# Patient Record
Sex: FEMALE | Race: BLACK OR AFRICAN AMERICAN | NOT HISPANIC OR LATINO | Employment: OTHER | URBAN - METROPOLITAN AREA
[De-identification: names, ages, dates, MRNs, and addresses within clinical notes are randomized per-mention and may not be internally consistent; named-entity substitution may affect disease eponyms.]

---

## 2019-02-18 ENCOUNTER — CONSULTATION (OUTPATIENT)
Dept: URBAN - METROPOLITAN AREA CLINIC 11 | Facility: CLINIC | Age: 69
End: 2019-02-18

## 2019-02-18 NOTE — PROCEDURE NOTE: CLINICAL
PROCEDURE NOTE: Avastin () #1 OD. Diagnosis: Diabetes, Type II with Ocular Complications. Anesthesia: Topical/Subconjunctival. Prep: Betadine Drops and Betadine Scrub. Intravitreal injection of Avastin 1.25mg/0.05 ml was given. Injection site: 3-4 mm from the limbus. Patient tolerated procedure well. CF vision checked. There were no complications. Post-op instructions given. Lot #: [id number]. Expiration Date: Wed Apr 10 2019 00:00:[address] Daylight Time). Inventory Id: [email or web address] [id number]

## 2019-02-18 NOTE — PATIENT DISCUSSION
Based on today’s exam, diagnostic studies, and/or review of records, the determination was made for treatment today in her right eye.

## 2019-02-18 NOTE — PATIENT DISCUSSION
Patient will need laser for diabetic retinopathy in the near future in both eyes based on clinical findings.

## 2019-02-18 NOTE — PATIENT DISCUSSION
Recommended an Avastin injection today in her right eye.  Recommended that she return in one month for an Avastin injection in her left eye.  Once the Avastin injections are complete, I have recommended PRP in both eyes in the future.

## 2019-02-21 ASSESSMENT — VISUAL ACUITY
OS_PH: 20/40-1
OS_CC: 20/50+1
OD_CC: 20/40

## 2019-06-27 ENCOUNTER — FOLLOW UP (OUTPATIENT)
Dept: URBAN - METROPOLITAN AREA CLINIC 11 | Facility: CLINIC | Age: 69
End: 2019-06-27

## 2019-06-27 ASSESSMENT — TONOMETRY
OS_IOP_MMHG: 17
OD_IOP_MMHG: 14

## 2019-06-27 ASSESSMENT — VISUAL ACUITY
OS_CC: 20/70-2
OD_PH: 20/25-2
OS_PH: 20/40-1
OD_CC: 20/40-1

## 2019-06-27 NOTE — PATIENT DISCUSSION
She has responded well to treatment and I have deferred any treatment while she is here today. I will see her back in 1 month for re-evaluation. She will call if she has any problems before then.

## 2019-08-20 ENCOUNTER — FOLLOW UP (OUTPATIENT)
Dept: URBAN - METROPOLITAN AREA CLINIC 11 | Facility: CLINIC | Age: 69
End: 2019-08-20

## 2019-08-20 ASSESSMENT — VISUAL ACUITY
OD_PH: 20/25-2
OD_CC: 20/30-2
OS_CC: 20/70

## 2019-08-20 ASSESSMENT — TONOMETRY
OS_IOP_MMHG: 17
OD_IOP_MMHG: 16

## 2019-08-20 NOTE — PROCEDURE NOTE: CLINICAL
PROCEDURE NOTE: Avastin () #2 OS. Diagnosis: Diabetes, Type II with Ocular Complications. Anesthesia: Topical/Subconjunctival. Prep: Betadine Drops and Betadine Scrub. Intravitreal injection of Avastin 1.25mg/0.05 ml was given. Injection site: 3-4 mm from the limbus. Patient tolerated procedure well. CF vision checked. There were no complications. Post-op instructions given. Lot #: [id number]. Expiration Date: Mon Sep 23 2019 00:00:[address] Daylight Time). Inventory Id: null. Tierney Jain

## 2019-08-20 NOTE — PATIENT DISCUSSION
Avastin #2 recommended today for the left eye. I will bring her back in a few weeks for PRP in the left eye.

## 2020-02-04 ENCOUNTER — FOLLOW UP (OUTPATIENT)
Dept: URBAN - METROPOLITAN AREA CLINIC 11 | Facility: CLINIC | Age: 70
End: 2020-02-04

## 2020-02-04 ASSESSMENT — TONOMETRY
OS_IOP_MMHG: 13
OD_IOP_MMHG: 15

## 2020-02-04 ASSESSMENT — VISUAL ACUITY
OS_CC: 20/100-1
OD_CC: 20/30+1

## 2020-02-04 NOTE — PATIENT DISCUSSION
She has responded well to treatment and she is clear to have cataract surgery. I will see her again in 2 months for re-evaluation.

## 2020-05-07 ENCOUNTER — FOLLOW UP (OUTPATIENT)
Dept: URBAN - METROPOLITAN AREA CLINIC 11 | Facility: CLINIC | Age: 70
End: 2020-05-07

## 2020-05-07 ASSESSMENT — VISUAL ACUITY
OD_CC: 20/30-1
OS_CC: 20/50-2

## 2020-05-07 ASSESSMENT — TONOMETRY
OS_IOP_MMHG: 11
OD_IOP_MMHG: 12

## 2020-05-07 NOTE — PATIENT DISCUSSION
I have explained to Nolan Patton. Perla Howard that there are some blood cells present in the vitreous of the left eye. I have recommended observation at this stage. I feel this will clear without surgery. I will see her again in 2 months, or sooner, if she has any problems or concerns.

## 2020-08-11 ENCOUNTER — FOLLOW UP (OUTPATIENT)
Dept: URBAN - METROPOLITAN AREA CLINIC 11 | Facility: CLINIC | Age: 70
End: 2020-08-11

## 2020-08-11 ASSESSMENT — VISUAL ACUITY
OS_CC: CF 1FT
OD_CC: 20/40

## 2020-08-11 ASSESSMENT — TONOMETRY
OS_IOP_MMHG: 18
OD_IOP_MMHG: 16

## 2020-08-11 NOTE — PATIENT DISCUSSION
Recommended Avastin injection in the left eye.  Explained condition and treatment with patient. Injection given and tolerated well by patient.

## 2020-08-11 NOTE — PROCEDURE NOTE: CLINICAL
PROCEDURE NOTE: Avastin () #3 OS. Diagnosis: Diabetes, Type II with Ocular Complications. Anesthesia: Topical/Subconjunctival. Prep: Betadine Drops and Betadine Scrub. Intravitreal injection of Avastin 1.25mg/0.05 ml was given. Injection site: 3-4 mm from the limbus. Patient tolerated procedure well. CF vision checked. There were no complications. Post-op instructions given. Lot #: [id number]. Expiration Date: 9/15/2020. Inventory Id: [email or web address] Hassan

## 2020-09-15 ENCOUNTER — FOLLOW UP (OUTPATIENT)
Dept: URBAN - METROPOLITAN AREA CLINIC 11 | Facility: CLINIC | Age: 70
End: 2020-09-15

## 2020-09-15 ASSESSMENT — TONOMETRY: OS_IOP_MMHG: 17

## 2020-09-15 ASSESSMENT — VISUAL ACUITY
OS_CC: CF 4FT
OD_CC: 20/30-1

## 2020-09-15 NOTE — PATIENT DISCUSSION
Explained to patient that there is not much improvement in the vitreous hemorrhage since her last visit.  I explained to her that I would defer any surgical intervention at this point.  Observation is recommended.

## 2021-02-23 ENCOUNTER — FOLLOW UP (OUTPATIENT)
Dept: URBAN - METROPOLITAN AREA CLINIC 11 | Facility: CLINIC | Age: 71
End: 2021-02-23

## 2021-02-23 NOTE — PATIENT DISCUSSION
35 minutes spent in discussion with Ms. Nydia Barkley. I have explained that the hemorrhage in her left eye has not cleared and my recommendation is to schedule her for vitrectomy surgery. I will plan to do more laser at the time of her surgery and will use silicone oil to keep her retina in place. All questions and concerns addressed at length and she will call when she is ready to schedule.

## 2021-02-24 ASSESSMENT — TONOMETRY
OD_IOP_MMHG: 14
OS_IOP_MMHG: 19

## 2021-02-24 ASSESSMENT — VISUAL ACUITY: OD_CC: 20/40

## 2021-07-02 ENCOUNTER — POST-OP CHECK (OUTPATIENT)
Dept: URBAN - METROPOLITAN AREA CLINIC 11 | Facility: CLINIC | Age: 71
End: 2021-07-02

## 2021-07-02 DIAGNOSIS — E11.3593: ICD-10-CM

## 2021-07-02 DIAGNOSIS — H33.42: ICD-10-CM

## 2021-07-02 DIAGNOSIS — H43.12: ICD-10-CM

## 2021-07-02 PROCEDURE — 99024 POSTOP FOLLOW-UP VISIT: CPT

## 2021-07-02 ASSESSMENT — TONOMETRY
OS_IOP_MMHG: 09
OD_IOP_MMHG: 15

## 2021-07-02 ASSESSMENT — VISUAL ACUITY: OD_CC: 20/40

## 2021-07-02 NOTE — PATIENT DISCUSSION
30 minutes spent in face to face dialogue with patient. Her right eye is doing well and her diabetic retinopathy is stable. Poor visual prognosis for her left eye. I will see her in 3 months for re-evaluation, She is to call if she has any problems or concerns.

## 2021-09-30 ENCOUNTER — FOLLOW UP (OUTPATIENT)
Dept: URBAN - METROPOLITAN AREA CLINIC 11 | Facility: CLINIC | Age: 71
End: 2021-09-30

## 2021-09-30 DIAGNOSIS — H33.42: ICD-10-CM

## 2021-09-30 DIAGNOSIS — E11.3593: ICD-10-CM

## 2021-09-30 PROCEDURE — 99214 OFFICE O/P EST MOD 30 MIN: CPT

## 2021-09-30 ASSESSMENT — TONOMETRY
OS_IOP_MMHG: 07
OD_IOP_MMHG: 13

## 2021-09-30 ASSESSMENT — VISUAL ACUITY: OD_CC: 20/30-2

## 2021-09-30 NOTE — PATIENT DISCUSSION
30 minutes spent in face to face dialogue with patient. Her right eye is doing well and her diabetic retinopathy is stable. Poor visual prognosis for her left eye and I have recommended that we leave the silicone oil.  I will see her in 4 months for re-evaluation, She is to call if she has any problems or concerns. I would like for her to see you for general ophthalmic care. She will call your office to make an appointment.

## 2022-01-27 ENCOUNTER — COMPREHENSIVE EXAM (OUTPATIENT)
Dept: URBAN - METROPOLITAN AREA CLINIC 11 | Facility: CLINIC | Age: 72
End: 2022-01-27

## 2022-01-27 DIAGNOSIS — H33.42: ICD-10-CM

## 2022-01-27 DIAGNOSIS — H43.12: ICD-10-CM

## 2022-01-27 DIAGNOSIS — E11.3593: ICD-10-CM

## 2022-01-27 PROCEDURE — 99214 OFFICE O/P EST MOD 30 MIN: CPT

## 2022-01-27 ASSESSMENT — TONOMETRY
OD_IOP_MMHG: 12
OS_IOP_MMHG: 10

## 2022-01-27 ASSESSMENT — VISUAL ACUITY: OD_CC: 20/30-2

## 2022-01-27 NOTE — PATIENT DISCUSSION
35 minutes spent in face to face dialogue with patient. Her right eye is doing well and her diabetic retinopathy is stable. Poor visual prognosis for her left eye and I have recommended that we leave the silicone oil.  I discussed with her that her cataract is progressing and I advised her to check in with you for evaluation and possible surgery scheduling. I will planto see her in 4 months for re-evaluation.

## 2022-05-05 ENCOUNTER — COMPREHENSIVE EXAM (OUTPATIENT)
Dept: URBAN - METROPOLITAN AREA CLINIC 11 | Facility: CLINIC | Age: 72
End: 2022-05-05

## 2022-05-05 DIAGNOSIS — E11.3593: ICD-10-CM

## 2022-05-05 DIAGNOSIS — H33.42: ICD-10-CM

## 2022-05-05 DIAGNOSIS — H43.12: ICD-10-CM

## 2022-05-05 PROCEDURE — 99214 OFFICE O/P EST MOD 30 MIN: CPT

## 2022-05-05 PROCEDURE — 92134 CPTRZ OPH DX IMG PST SGM RTA: CPT

## 2022-05-05 ASSESSMENT — VISUAL ACUITY
OD_PH: 20/50+1
OD_SC: 20/100

## 2022-05-05 ASSESSMENT — TONOMETRY
OD_IOP_MMHG: 20
OS_IOP_MMHG: 11

## 2022-05-05 NOTE — PATIENT DISCUSSION
45 minutes spent in face to face dialogue with patient. Her right eye is doing well and her diabetic retinopathy is stable. Poor visual prognosis for her left eye and I have recommended that we leave the silicone oil.  I discussed with her that her cataract is progressing and I advised her to check in with you for evaluation and possible surgery scheduling in the Summer. Patient will contact your office for an appointment.  She is cleared for cataract surgery. I will see her in 6 months for re-evaluation.

## 2022-06-18 RX ORDER — INSULIN LISPRO 100 [IU]/ML
INJECTION, SOLUTION INTRAVENOUS; SUBCUTANEOUS
COMMUNITY
Start: 2021-08-10 | End: 2022-08-26

## 2022-06-18 RX ORDER — AMLODIPINE BESYLATE 5 MG/1
TABLET ORAL
COMMUNITY

## 2022-06-18 RX ORDER — CIPROFLOXACIN 500 MG/1
TABLET, FILM COATED ORAL
COMMUNITY

## 2022-06-18 RX ORDER — INSULIN LISPRO 100 [IU]/ML
INJECTION, SOLUTION INTRAVENOUS; SUBCUTANEOUS
COMMUNITY

## 2022-06-18 RX ORDER — DOXYCYCLINE HYCLATE 100 MG
TABLET ORAL
COMMUNITY

## 2022-06-18 RX ORDER — OMEPRAZOLE 40 MG/1
1 CAPSULE, DELAYED RELEASE ORAL
COMMUNITY

## 2022-07-17 PROBLEM — G93.32 CHRONIC FATIGUE SYNDROME: Status: ACTIVE | Noted: 2022-07-17

## 2022-07-17 PROBLEM — N61.1 ABSCESS OF RIGHT BREAST: Status: ACTIVE | Noted: 2022-07-17

## 2022-07-17 PROBLEM — E11.9 DIABETES MELLITUS WITHOUT COMPLICATION (HCC): Status: ACTIVE | Noted: 2022-07-17

## 2022-07-17 PROBLEM — I10 ESSENTIAL HYPERTENSION: Status: ACTIVE | Noted: 2022-07-17

## 2022-07-17 PROBLEM — D24.1 INTRADUCTAL PAPILLOMA OF RIGHT BREAST: Status: ACTIVE | Noted: 2022-07-17

## 2022-07-17 PROBLEM — E78.5 HYPERLIPIDEMIA: Status: ACTIVE | Noted: 2022-07-17

## 2022-08-05 PROBLEM — E11.42 DIABETIC POLYNEUROPATHY (HCC): Status: ACTIVE | Noted: 2019-05-17

## 2022-08-05 PROBLEM — M20.40 ACQUIRED HAMMER TOE: Status: ACTIVE | Noted: 2019-05-17

## 2022-08-05 PROBLEM — N60.11 FIBROCYSTIC CHANGES OF RIGHT BREAST: Status: ACTIVE | Noted: 2022-08-05

## 2022-08-05 PROBLEM — I10 HYPERTENSION: Status: ACTIVE | Noted: 2022-08-05

## 2022-08-05 PROBLEM — E11.9 DIABETES MELLITUS (HCC): Status: ACTIVE | Noted: 2022-08-05

## 2022-08-05 PROBLEM — J86.9 ABSCESS OF CHEST (HCC): Status: ACTIVE | Noted: 2022-08-05

## 2022-08-05 PROBLEM — E11.69 TYPE 2 DIABETES MELLITUS WITH OTHER SPECIFIED COMPLICATION (HCC): Status: ACTIVE | Noted: 2022-08-05
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
## 2022-08-05 PROBLEM — M79.606 PAIN IN LOWER LIMB: Status: ACTIVE | Noted: 2019-05-17

## 2022-11-07 ENCOUNTER — COMPREHENSIVE EXAM (OUTPATIENT)
Dept: URBAN - METROPOLITAN AREA CLINIC 11 | Facility: CLINIC | Age: 72
End: 2022-11-07

## 2022-11-07 DIAGNOSIS — E11.3593: ICD-10-CM

## 2022-11-07 DIAGNOSIS — H25.11: ICD-10-CM

## 2022-11-07 DIAGNOSIS — Z79.4: ICD-10-CM

## 2022-11-07 PROCEDURE — 99214 OFFICE O/P EST MOD 30 MIN: CPT

## 2022-11-07 PROCEDURE — 92134 CPTRZ OPH DX IMG PST SGM RTA: CPT

## 2022-11-07 PROCEDURE — 92201 OPSCPY EXTND RTA DRAW UNI/BI: CPT

## 2022-11-07 ASSESSMENT — VISUAL ACUITY
OD_SC: 20/100+1
OD_PH: 20/40-2

## 2022-11-07 ASSESSMENT — TONOMETRY: OD_IOP_MMHG: 9

## 2022-11-07 NOTE — PATIENT DISCUSSION
Cataract surgery recommended. Cleared when ready. Please contact patient to set up this appointment.

## 2023-01-17 ENCOUNTER — FOLLOW UP (OUTPATIENT)
Dept: URBAN - METROPOLITAN AREA CLINIC 11 | Facility: CLINIC | Age: 73
End: 2023-01-17

## 2023-01-17 DIAGNOSIS — Z79.4: ICD-10-CM

## 2023-01-17 DIAGNOSIS — E11.3593: ICD-10-CM

## 2023-01-17 PROCEDURE — 92134 CPTRZ OPH DX IMG PST SGM RTA: CPT

## 2023-01-17 PROCEDURE — 92201 OPSCPY EXTND RTA DRAW UNI/BI: CPT

## 2023-01-17 PROCEDURE — 99213 OFFICE O/P EST LOW 20 MIN: CPT

## 2023-01-17 ASSESSMENT — TONOMETRY: OD_IOP_MMHG: 10

## 2023-01-17 ASSESSMENT — VISUAL ACUITY: OD_CC: 20/70-1

## 2023-06-13 ENCOUNTER — ESTABLISHED PATIENT (OUTPATIENT)
Dept: URBAN - METROPOLITAN AREA CLINIC 11 | Facility: CLINIC | Age: 73
End: 2023-06-13

## 2023-06-13 DIAGNOSIS — H33.42: ICD-10-CM

## 2023-06-13 DIAGNOSIS — Z96.1: ICD-10-CM

## 2023-06-13 DIAGNOSIS — Z79.4: ICD-10-CM

## 2023-06-13 DIAGNOSIS — E11.3593: ICD-10-CM

## 2023-06-13 PROCEDURE — 67028 INJECTION EYE DRUG: CPT

## 2023-06-13 PROCEDURE — 99214 OFFICE O/P EST MOD 30 MIN: CPT

## 2023-06-13 PROCEDURE — 92134 CPTRZ OPH DX IMG PST SGM RTA: CPT

## 2023-06-13 ASSESSMENT — VISUAL ACUITY
OD_CC: 20/400
OD_PH: 20/200-1

## 2023-06-13 ASSESSMENT — TONOMETRY
OD_IOP_MMHG: 11
OS_IOP_MMHG: 4

## 2023-07-11 ENCOUNTER — CLINIC PROCEDURE ONLY (OUTPATIENT)
Dept: URBAN - METROPOLITAN AREA CLINIC 11 | Facility: CLINIC | Age: 73
End: 2023-07-11

## 2023-07-11 DIAGNOSIS — E11.3593: ICD-10-CM

## 2023-07-11 PROCEDURE — 67028 INJECTION EYE DRUG: CPT

## 2023-07-11 ASSESSMENT — TONOMETRY
OD_IOP_MMHG: 5
OS_IOP_MMHG: 4

## 2023-07-11 ASSESSMENT — VISUAL ACUITY: OD_CC: 20/50-1

## 2023-08-10 ENCOUNTER — CLINIC PROCEDURE ONLY (OUTPATIENT)
Dept: URBAN - METROPOLITAN AREA CLINIC 11 | Facility: CLINIC | Age: 73
End: 2023-08-10

## 2023-08-10 DIAGNOSIS — E11.3593: ICD-10-CM

## 2023-08-10 PROCEDURE — 67028 INJECTION EYE DRUG: CPT

## 2023-08-10 ASSESSMENT — VISUAL ACUITY: OD_CC: 20/30-1

## 2023-08-10 ASSESSMENT — TONOMETRY
OD_IOP_MMHG: 10
OS_IOP_MMHG: 6

## 2023-09-21 ENCOUNTER — FOLLOW UP (OUTPATIENT)
Dept: URBAN - METROPOLITAN AREA CLINIC 11 | Facility: CLINIC | Age: 73
End: 2023-09-21

## 2023-09-21 DIAGNOSIS — E11.3593: ICD-10-CM

## 2023-09-21 DIAGNOSIS — Z79.4: ICD-10-CM

## 2023-09-21 DIAGNOSIS — Z96.1: ICD-10-CM

## 2023-09-21 PROCEDURE — 99213 OFFICE O/P EST LOW 20 MIN: CPT

## 2023-09-21 PROCEDURE — 92134 CPTRZ OPH DX IMG PST SGM RTA: CPT

## 2023-09-21 ASSESSMENT — TONOMETRY
OD_IOP_MMHG: 8
OS_IOP_MMHG: 6

## 2023-09-21 ASSESSMENT — VISUAL ACUITY: OD_CC: 20/30-1

## 2023-11-09 ENCOUNTER — FOLLOW UP (OUTPATIENT)
Dept: URBAN - METROPOLITAN AREA CLINIC 11 | Facility: CLINIC | Age: 73
End: 2023-11-09

## 2023-11-09 DIAGNOSIS — Z79.4: ICD-10-CM

## 2023-11-09 DIAGNOSIS — E11.3593: ICD-10-CM

## 2023-11-09 DIAGNOSIS — Z96.1: ICD-10-CM

## 2023-11-09 PROCEDURE — 99213 OFFICE O/P EST LOW 20 MIN: CPT

## 2023-11-09 PROCEDURE — 92134 CPTRZ OPH DX IMG PST SGM RTA: CPT

## 2023-11-09 ASSESSMENT — VISUAL ACUITY: OD_CC: 20/25-2

## 2023-11-09 ASSESSMENT — TONOMETRY
OS_IOP_MMHG: 11
OD_IOP_MMHG: 11

## 2024-02-08 ENCOUNTER — COMPREHENSIVE EXAM (OUTPATIENT)
Dept: URBAN - METROPOLITAN AREA CLINIC 11 | Facility: CLINIC | Age: 74
End: 2024-02-08

## 2024-02-08 DIAGNOSIS — Z79.4: ICD-10-CM

## 2024-02-08 DIAGNOSIS — E11.3593: ICD-10-CM

## 2024-02-08 DIAGNOSIS — H33.42: ICD-10-CM

## 2024-02-08 DIAGNOSIS — H43.811: ICD-10-CM

## 2024-02-08 PROCEDURE — 92134 CPTRZ OPH DX IMG PST SGM RTA: CPT

## 2024-02-08 PROCEDURE — 99214 OFFICE O/P EST MOD 30 MIN: CPT

## 2024-02-08 ASSESSMENT — TONOMETRY
OS_IOP_MMHG: 8
OD_IOP_MMHG: 10

## 2024-02-08 ASSESSMENT — VISUAL ACUITY: OD_CC: 20/30+2

## 2024-11-04 ENCOUNTER — COMPREHENSIVE EXAM (OUTPATIENT)
Dept: URBAN - METROPOLITAN AREA CLINIC 11 | Facility: CLINIC | Age: 74
End: 2024-11-04

## 2024-11-04 DIAGNOSIS — E11.3511: ICD-10-CM

## 2024-11-04 DIAGNOSIS — E11.3592: ICD-10-CM

## 2024-11-04 DIAGNOSIS — H33.42: ICD-10-CM

## 2024-11-04 DIAGNOSIS — H43.811: ICD-10-CM

## 2024-11-04 DIAGNOSIS — Z79.4: ICD-10-CM

## 2024-11-04 PROCEDURE — 99214 OFFICE O/P EST MOD 30 MIN: CPT | Mod: 25

## 2024-11-04 PROCEDURE — 67028 INJECTION EYE DRUG: CPT

## 2024-11-04 PROCEDURE — 92134 CPTRZ OPH DX IMG PST SGM RTA: CPT

## 2024-12-02 ENCOUNTER — FOLLOW UP (OUTPATIENT)
Age: 74
End: 2024-12-02

## 2024-12-02 DIAGNOSIS — H43.811: ICD-10-CM

## 2024-12-02 DIAGNOSIS — E11.3592: ICD-10-CM

## 2024-12-02 DIAGNOSIS — H33.42: ICD-10-CM

## 2024-12-02 DIAGNOSIS — E11.3511: ICD-10-CM

## 2024-12-02 PROCEDURE — 92134 CPTRZ OPH DX IMG PST SGM RTA: CPT

## 2024-12-02 PROCEDURE — 99213 OFFICE O/P EST LOW 20 MIN: CPT

## 2025-02-13 ENCOUNTER — EMERGENCY VISIT (OUTPATIENT)
Age: 75
End: 2025-02-13

## 2025-02-13 DIAGNOSIS — H43.811: ICD-10-CM

## 2025-02-13 DIAGNOSIS — H33.42: ICD-10-CM

## 2025-02-13 DIAGNOSIS — Z79.4: ICD-10-CM

## 2025-02-13 DIAGNOSIS — E11.3511: ICD-10-CM

## 2025-02-13 DIAGNOSIS — E11.3592: ICD-10-CM

## 2025-02-13 PROCEDURE — 92134 CPTRZ OPH DX IMG PST SGM RTA: CPT

## 2025-02-13 PROCEDURE — 99213 OFFICE O/P EST LOW 20 MIN: CPT

## 2025-05-27 ENCOUNTER — EMERGENCY VISIT (OUTPATIENT)
Age: 75
End: 2025-05-27

## 2025-05-27 DIAGNOSIS — Z79.4: ICD-10-CM

## 2025-05-27 DIAGNOSIS — H35.051: ICD-10-CM

## 2025-05-27 DIAGNOSIS — E11.3511: ICD-10-CM

## 2025-05-27 PROCEDURE — 92134 CPTRZ OPH DX IMG PST SGM RTA: CPT

## 2025-05-27 PROCEDURE — 67028 INJECTION EYE DRUG: CPT

## 2025-06-17 ENCOUNTER — FOLLOW UP (OUTPATIENT)
Age: 75
End: 2025-06-17

## 2025-06-17 DIAGNOSIS — H33.42: ICD-10-CM

## 2025-06-17 DIAGNOSIS — Z96.1: ICD-10-CM

## 2025-06-17 DIAGNOSIS — E11.3592: ICD-10-CM

## 2025-06-17 DIAGNOSIS — H43.11: ICD-10-CM

## 2025-06-17 DIAGNOSIS — Z79.4: ICD-10-CM

## 2025-06-17 DIAGNOSIS — H35.051: ICD-10-CM

## 2025-06-17 DIAGNOSIS — H43.811: ICD-10-CM

## 2025-06-17 DIAGNOSIS — E11.3511: ICD-10-CM

## 2025-06-17 PROCEDURE — 99213 OFFICE O/P EST LOW 20 MIN: CPT

## 2025-06-17 PROCEDURE — 92134 CPTRZ OPH DX IMG PST SGM RTA: CPT

## 2025-07-17 ENCOUNTER — FOLLOW UP (OUTPATIENT)
Age: 75
End: 2025-07-17

## 2025-07-17 DIAGNOSIS — H33.42: ICD-10-CM

## 2025-07-17 DIAGNOSIS — Z96.1: ICD-10-CM

## 2025-07-17 DIAGNOSIS — H35.051: ICD-10-CM

## 2025-07-17 DIAGNOSIS — E11.3592: ICD-10-CM

## 2025-07-17 DIAGNOSIS — Z79.4: ICD-10-CM

## 2025-07-17 DIAGNOSIS — H43.11: ICD-10-CM

## 2025-07-17 DIAGNOSIS — E11.3511: ICD-10-CM

## 2025-07-17 DIAGNOSIS — H43.811: ICD-10-CM

## 2025-07-17 PROCEDURE — 99213 OFFICE O/P EST LOW 20 MIN: CPT

## 2025-07-17 PROCEDURE — 92134 CPTRZ OPH DX IMG PST SGM RTA: CPT

## 2025-08-28 ENCOUNTER — FOLLOW UP (OUTPATIENT)
Age: 75
End: 2025-08-28

## 2025-08-28 DIAGNOSIS — H43.811: ICD-10-CM

## 2025-08-28 DIAGNOSIS — Z79.4: ICD-10-CM

## 2025-08-28 DIAGNOSIS — H35.051: ICD-10-CM

## 2025-08-28 DIAGNOSIS — E11.3592: ICD-10-CM

## 2025-08-28 DIAGNOSIS — H43.11: ICD-10-CM

## 2025-08-28 DIAGNOSIS — Z96.1: ICD-10-CM

## 2025-08-28 DIAGNOSIS — E11.3511: ICD-10-CM

## 2025-08-28 DIAGNOSIS — H33.42: ICD-10-CM

## 2025-08-28 PROCEDURE — 92134 CPTRZ OPH DX IMG PST SGM RTA: CPT

## 2025-08-28 PROCEDURE — 99213 OFFICE O/P EST LOW 20 MIN: CPT
